# Patient Record
Sex: MALE | Race: WHITE
[De-identification: names, ages, dates, MRNs, and addresses within clinical notes are randomized per-mention and may not be internally consistent; named-entity substitution may affect disease eponyms.]

---

## 2022-09-30 ENCOUNTER — HOSPITAL ENCOUNTER (EMERGENCY)
Dept: HOSPITAL 12 - ER | Age: 47
Discharge: LEFT BEFORE DISCHARGE | End: 2022-09-30
Payer: SELF-PAY

## 2022-09-30 ENCOUNTER — HOSPITAL ENCOUNTER (EMERGENCY)
Dept: HOSPITAL 12 - ER | Age: 47
Discharge: HOME | End: 2022-09-30
Payer: MEDICAID

## 2022-09-30 VITALS — WEIGHT: 210 LBS | HEIGHT: 70 IN | BODY MASS INDEX: 30.06 KG/M2
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
## 2022-09-30 DIAGNOSIS — Z53.21: Primary | ICD-10-CM

## 2022-09-30 DIAGNOSIS — S71.111A: Primary | ICD-10-CM
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
## 2022-09-30 PROCEDURE — 90715 TDAP VACCINE 7 YRS/> IM: CPT

## 2022-09-30 PROCEDURE — 99283 EMERGENCY DEPT VISIT LOW MDM: CPT

## 2022-09-30 PROCEDURE — A4663 DIALYSIS BLOOD PRESSURE CUFF: HCPCS

## 2022-09-30 PROCEDURE — 90471 IMMUNIZATION ADMIN: CPT

## 2022-09-30 PROCEDURE — 12004 RPR S/N/AX/GEN/TRK7.6-12.5CM: CPT

## 2022-09-30 NOTE — NUR
wound was wrapped, tetanus shot given. 





Patient discharged to home in stable condition.  Written and verbal after care 
instructions given. 

Patient verbalizes understanding of instructions. Stressed follow up or return 
to ER for worsening s/s.

## 2022-10-04 ENCOUNTER — HOSPITAL ENCOUNTER (EMERGENCY)
Dept: HOSPITAL 12 - ER | Age: 47
LOS: 1 days | Discharge: LEFT BEFORE DISCHARGE | End: 2022-10-05
Payer: SELF-PAY

## 2022-10-04 VITALS — BODY MASS INDEX: 28.63 KG/M2 | HEIGHT: 70 IN | WEIGHT: 200 LBS

## 2022-10-04 DIAGNOSIS — Z53.21: Primary | ICD-10-CM
